# Patient Record
Sex: FEMALE | Race: WHITE
[De-identification: names, ages, dates, MRNs, and addresses within clinical notes are randomized per-mention and may not be internally consistent; named-entity substitution may affect disease eponyms.]

---

## 2020-06-29 LAB
ERYTHROCYTE [DISTWIDTH] IN BLOOD BY AUTOMATED COUNT: 12.3 % (ref 11.5–14.5)
HCT VFR BLD CALC: 39.7 % (ref 36–48)
HGB BLD-MCNC: 12.9 G/DL (ref 12–16)
MCH RBC QN AUTO: 30.1 PG (ref 26–34)
MCHC RBC AUTO-ENTMCNC: 32.5 G/DL (ref 31–37)
MCV RBC: 92.8 FL (ref 80–100)
PLATELET # BLD: 140 10X3/UL (ref 130–400)
PMV BLD AUTO: 11.1 FL (ref 7.4–10.4)
RBC # BLD AUTO: 4.28 10X6/UL (ref 4–5.4)
WBC # BLD AUTO: 3.8 10X3/UL (ref 4.8–10.8)

## 2020-07-01 ENCOUNTER — HOSPITAL ENCOUNTER (OUTPATIENT)
Dept: HOSPITAL 84 - D.OPS | Age: 70
Discharge: HOME | End: 2020-07-01
Attending: PODIATRIST
Payer: MEDICARE

## 2020-07-01 VITALS — BODY MASS INDEX: 24.91 KG/M2 | WEIGHT: 174 LBS | HEIGHT: 70 IN | BODY MASS INDEX: 24.91 KG/M2

## 2020-07-01 VITALS — DIASTOLIC BLOOD PRESSURE: 76 MMHG | SYSTOLIC BLOOD PRESSURE: 118 MMHG

## 2020-07-01 DIAGNOSIS — R22.43: Primary | ICD-10-CM

## 2020-07-01 NOTE — OP
PATIENT NAME:  MONTANA URRUTIA                           MEDICAL RECORD: [id number]
:[birth year]                                             LOCATION:[facility]          
                                                         ADMISSION DATE:        
SURGEON:  MELLY TALAMANTES DPM        
 
 
DATE OF OPERATION:  2020
 
PREOPERATIVE DIAGNOSIS:  Mass, dorsal aspect of bilateral feet.
 
POSTOPERATIVE DIAGNOSES:  Mass, dorsal aspect of bilateral feet with inclusion
of spur, dorsal bilateral feet.
 
PROCEDURE:  Removal of mass, dorsal bilateral feet as well as spur removal,
dorsal bilateral feet.
 
ANESTHESIA:  General with local infiltrate utilizing 8 cc in the dorsal aspect
of bilateral feet.
 
HEMOSTASIS:  Bilateral tourniquets at 250 mmHg.
 
PREOPERATIVE DETAILS:  The patient was taken to the OR, placed on the operating
table in supine position followed by infiltration of local anesthetic and dorsal
aspect of bilateral feet.  The bilateral extremities were then prepped and
draped in usual aseptic technique followed by exsanguination and inflation of
the tourniquet of the right foot first.
 
DESCRIPTION OF PROCEDURE:  PROCEDURE #1:  Dorsal mass and spur removal, right
foot.  A 15 blade was used to create a 3 cm linear incision over the dorsal
aspect of the right foot overlying the middle cuneiform joint and second
metatarsal.  The incision was deepened down through subcutaneous tissue being
sure to avoid all vital structures.  These nerve as well as the artery were
identified and retracted in the wound.  There was noted to be a lipoma and scar
tissue, which was excised.  Following the excision of the lipoma and scar tissue
from a previous surgery, there was noted to be spurring on the dorsal aspect of
the mid foot joints.  At this time, a periosteal incision was made and freed
from the dorsal aspect of the joints.  A rasp was then used to smooth the joints
involving the first met cuneiform joint, the second met cuneiform joint, and the
third met cuneiform joint.  Following this, excellent reduction in the size of
the dorsal aspect of the foot was noted.  The wound was flushed.  The periosteum
was repaired with 2-0 Vicryl, and the subcutaneous tissue with 4-0 Rapide and
the skin was closed with 4-0 Rapide in a subcuticular technique followed by
Dermabond.  Adaptic, 4 x 4 and Conform were used to dress the wound followed by
Ace wrap.  Tourniquet was deflated.
 
PROCEDURE #2:  Mass removal as well as spur removal, dorsal aspect of the left
foot.  The incision as described in 1 was performed without variation and no
complication.
 
POSTOPERATIVE DETAILS:  The patient tolerated the procedure well and left the OR
with vital signs stable and vascular status at preoperative levels.  The patient
was transported to recovery per anesthesia in stable condition.
 
TRANSINT:[id number] Voice Confirmation ID: [id number] DOCUMENT ID: [id number]
                                           
 
 
 
OPERATIVE REPORT                               [id number]    MONTANA URRUTIA MCKAY DPM        
 
 
 
 
 
 
 
 
 
 
 
 
 
 
 
 
 
 
 
 
 
 
 
 
 
 
 
 
 
 
 
 
 
 
 
 
 
 
 
 
 
 
 
 
 
 
CC:                                                             [id number]
DICTATION DATE: 20 1101     :     20 1548      [facility] 
                                                                      [address] [place] [address]

## 2020-07-01 NOTE — NUR
0630-WHILE IN BATHROOM, METAL COVER TO PAPER TOWEL DISPENSER OPENED AND STRUCK
PATIENT ON HEAD.  ASSISTED TO BED, NOTED QUARTER SIZED RAISED AREA ON TOP OF
HEAD.  NO LOSS OF CONSCIOUSNESS NOTED, NO SKIN BREAKDOWN.  ICE PACK OFFERED.
DR. TALAMANTES PHONED WITH STATUS REPORT.
[id number]. TALAMANTES HERE AT BEDSIDE TO ASSESS PATIENT.  VERBAL ORDER GIVEN TO
PROCEED WITH PROCEDURE.

## 2021-05-18 LAB
ANION GAP SERPL CALC-SCNC: 15.5 MMOL/L (ref 8–16)
BASOPHILS NFR BLD AUTO: 1.9 % (ref 0–2)
BUN SERPL-MCNC: 21 MG/DL (ref 7–18)
CALCIUM SERPL-MCNC: 9.6 MG/DL (ref 8.5–10.1)
CHLORIDE SERPL-SCNC: 103 MMOL/L (ref 98–107)
CO2 SERPL-SCNC: 25.4 MMOL/L (ref 21–32)
CREAT SERPL-MCNC: 1.1 MG/DL (ref 0.6–1.3)
EOSINOPHIL NFR BLD: 3.2 % (ref 0–7)
ERYTHROCYTE [DISTWIDTH] IN BLOOD BY AUTOMATED COUNT: 13.7 % (ref 11.5–14.5)
GLUCOSE SERPL-MCNC: 101 MG/DL (ref 74–106)
HCT VFR BLD CALC: 40.8 % (ref 36–48)
HGB BLD-MCNC: 13.6 G/DL (ref 12–16)
LYMPHOCYTES # BLD: 1.1 10X3/UL (ref 1.18–3.74)
LYMPHOCYTES NFR BLD AUTO: 22.9 % (ref 15–50)
MCH RBC QN AUTO: 28.9 PG (ref 26–34)
MCHC RBC AUTO-ENTMCNC: 33.4 G/DL (ref 31–37)
MCV RBC: 86.6 FL (ref 80–100)
MONOCYTES NFR BLD: 7 % (ref 2–11)
NEUTROPHILS # BLD: 3.1 10X3/UL (ref 1.56–6.13)
NEUTROPHILS NFR BLD AUTO: 65 % (ref 40–80)
OSMOLALITY SERPL CALC.SUM OF ELEC: 281 MOSM/KG (ref 275–300)
PLATELET # BLD: 189 10X3/UL (ref 130–400)
PMV BLD AUTO: 8.4 FL (ref 7.4–10.4)
POTASSIUM SERPL-SCNC: 3.9 MMOL/L (ref 3.5–5.1)
RBC # BLD AUTO: 4.72 10X6/UL (ref 4–5.4)
SODIUM SERPL-SCNC: 140 MMOL/L (ref 136–145)
WBC # BLD AUTO: 4.7 10X3/UL (ref 4.8–10.8)

## 2021-05-19 ENCOUNTER — HOSPITAL ENCOUNTER (OUTPATIENT)
Dept: HOSPITAL 84 - D.OPS | Age: 71
Setting detail: OBSERVATION
LOS: 1 days | Discharge: HOME | End: 2021-05-20
Attending: SURGERY | Admitting: SURGERY
Payer: MEDICARE

## 2021-05-19 VITALS
HEIGHT: 70 IN | WEIGHT: 255.54 LBS | BODY MASS INDEX: 36.58 KG/M2 | BODY MASS INDEX: 36.58 KG/M2 | WEIGHT: 255.54 LBS | BODY MASS INDEX: 36.58 KG/M2 | HEIGHT: 70 IN

## 2021-05-19 VITALS — SYSTOLIC BLOOD PRESSURE: 129 MMHG | DIASTOLIC BLOOD PRESSURE: 74 MMHG

## 2021-05-19 VITALS — DIASTOLIC BLOOD PRESSURE: 64 MMHG | SYSTOLIC BLOOD PRESSURE: 118 MMHG

## 2021-05-19 VITALS — SYSTOLIC BLOOD PRESSURE: 119 MMHG | DIASTOLIC BLOOD PRESSURE: 71 MMHG

## 2021-05-19 VITALS — SYSTOLIC BLOOD PRESSURE: 119 MMHG | DIASTOLIC BLOOD PRESSURE: 68 MMHG

## 2021-05-19 DIAGNOSIS — K66.0: ICD-10-CM

## 2021-05-19 DIAGNOSIS — K55.049: Primary | ICD-10-CM

## 2021-05-19 NOTE — NUR
PT SITTING UP IN BED WITHOUT DISTRESS, AOX4. STAND BY ASSISTED PT TO BATHROOM
AND BACK TO BED. LAP SITES TO ABD CDI. REQUESTED AND GIVEN WATER AND CRACKERS.
DENIES OTHER NEEDS. CL IN REACH

## 2021-05-19 NOTE — NUR
1255 REPORT CALLED TO [facility]. PT TRANSFERRED VIA
STRETCHER AND WALKED FROM STRETCHER TO BED WITHOUT C/O. IN STABLE
CONDITION

## 2021-05-20 VITALS — DIASTOLIC BLOOD PRESSURE: 76 MMHG | SYSTOLIC BLOOD PRESSURE: 124 MMHG

## 2021-05-20 VITALS — DIASTOLIC BLOOD PRESSURE: 56 MMHG | SYSTOLIC BLOOD PRESSURE: 96 MMHG

## 2021-05-20 VITALS — SYSTOLIC BLOOD PRESSURE: 106 MMHG | DIASTOLIC BLOOD PRESSURE: 57 MMHG

## 2021-05-20 NOTE — NUR
REC'D IN ROOM SITTING ON SIDE OF BED AWAKE AND ALERT. RESP EVEN AND UNLABORED
WITH NO DISTRESS NOTED. CAN EXPRESS NEEDS AND WANTS. NO C/O NOTED. ASSESSMENT
COMPLETED. C/L IN REACH AT BEDSIDE.

## 2021-05-20 NOTE — NUR
DC HOME AT THIS TIME VOICE UNDERSTANDING DC INSTRUCTION. IV DC AT THIS TIME.
STABLE CONDITION UPON DEPARTURE.